# Patient Record
Sex: FEMALE | URBAN - METROPOLITAN AREA
[De-identification: names, ages, dates, MRNs, and addresses within clinical notes are randomized per-mention and may not be internally consistent; named-entity substitution may affect disease eponyms.]

---

## 2022-12-13 ENCOUNTER — APPOINTMENT (OUTPATIENT)
Dept: URBAN - METROPOLITAN AREA SURGERY 8 | Age: 37
Setting detail: DERMATOLOGY
End: 2022-12-13

## 2022-12-13 DIAGNOSIS — D36.1 BENIGN NEOPLASM OF PERIPHERAL NERVES AND AUTONOMIC NERVOUS SYSTEM: ICD-10-CM

## 2022-12-13 DIAGNOSIS — D485 NEOPLASM OF UNCERTAIN BEHAVIOR OF SKIN: ICD-10-CM

## 2022-12-13 DIAGNOSIS — L663 OTHER SPECIFIED DISEASES OF HAIR AND HAIR FOLLICLES: ICD-10-CM

## 2022-12-13 DIAGNOSIS — L738 OTHER SPECIFIED DISEASES OF HAIR AND HAIR FOLLICLES: ICD-10-CM

## 2022-12-13 PROBLEM — D36.16 BENIGN NEOPLASM OF PERIPHERAL NERVES AND AUTONOMIC NERVOUS SYSTEM OF PELVIS: Status: ACTIVE | Noted: 2022-12-13

## 2022-12-13 PROBLEM — L02.421 FURUNCLE OF RIGHT AXILLA: Status: ACTIVE | Noted: 2022-12-13

## 2022-12-13 PROBLEM — D48.5 NEOPLASM OF UNCERTAIN BEHAVIOR OF SKIN: Status: ACTIVE | Noted: 2022-12-13

## 2022-12-13 PROBLEM — L02.422 FURUNCLE OF LEFT AXILLA: Status: ACTIVE | Noted: 2022-12-13

## 2022-12-13 PROCEDURE — OTHER TREATMENT REGIMEN: OTHER

## 2022-12-13 PROCEDURE — OTHER ADDITIONAL NOTES: OTHER

## 2022-12-13 PROCEDURE — OTHER PRESCRIPTION: OTHER

## 2022-12-13 PROCEDURE — OTHER DEFER: OTHER

## 2022-12-13 PROCEDURE — 99203 OFFICE O/P NEW LOW 30 MIN: CPT

## 2022-12-13 PROCEDURE — OTHER MIPS QUALITY: OTHER

## 2022-12-13 PROCEDURE — OTHER COUNSELING: OTHER

## 2022-12-13 PROCEDURE — OTHER REASSURANCE: OTHER

## 2022-12-13 RX ORDER — CLINDAMYCIN PHOSPHATE 10 MG/ML
LOTION TOPICAL
Qty: 60 | Refills: 5 | Status: ERX | COMMUNITY
Start: 2022-12-13

## 2022-12-13 RX ORDER — BENZOYL PEROXIDE 10 %
FOAM (GRAM) TOPICAL
Qty: 156 | Refills: 5 | Status: ERX | COMMUNITY
Start: 2022-12-13

## 2022-12-13 ASSESSMENT — LOCATION SIMPLE DESCRIPTION DERM
LOCATION SIMPLE: LEFT BUTTOCK
LOCATION SIMPLE: LEFT AXILLARY VAULT
LOCATION SIMPLE: LABIA MAJORA
LOCATION SIMPLE: RIGHT AXILLARY VAULT

## 2022-12-13 ASSESSMENT — SEVERITY ASSESSMENT: SEVERITY: CLEAR

## 2022-12-13 ASSESSMENT — LOCATION DETAILED DESCRIPTION DERM
LOCATION DETAILED: LEFT BUTTOCK
LOCATION DETAILED: LEFT AXILLARY VAULT
LOCATION DETAILED: RIGHT AXILLARY VAULT
LOCATION DETAILED: RIGHT LABIUM MAJUS

## 2022-12-13 ASSESSMENT — LOCATION ZONE DERM
LOCATION ZONE: VULVA
LOCATION ZONE: TRUNK
LOCATION ZONE: AXILLAE

## 2022-12-13 NOTE — PROCEDURE: REASSURANCE
Additional Notes (Optional): discussed benign etiology but that could be treated via shave removal or excision as symptomatic
Hide Additional Notes?: No
Detail Level: Simple

## 2022-12-13 NOTE — PROCEDURE: DEFER
Other Procedure: shave removal vs excision
X Size Of Lesion In Cm (Optional): 0
Detail Level: Simple
Size Of Lesion In Cm (Optional): 0.8
Introduction Text (Please End With A Colon): The following procedure was deferred:
Reason To Defer Override: declines exam today. referred to [facility]

## 2022-12-13 NOTE — PROCEDURE: TREATMENT REGIMEN
Initiate Treatment: BPO was 2-3x/week in shower or daily if not too irritating\\nClindmycin lotion daily to BID prn active lesions or as aftershave (though denies she waxes or shaves area)
Plan: Consider addition of doxycycline if not controlled with topicals
Detail Level: Simple

## 2022-12-13 NOTE — HPI: SKIN LESION
How Severe Is Your Skin Lesion?: moderate
Has Your Skin Lesion Been Treated?: been treated
Is This A New Presentation, Or A Follow-Up?: Skin Lesion
What Type Of Note Output Would You Prefer (Optional)?: Standard Output
Has Your Skin Lesion Been Treated?: not been treated

## 2022-12-13 NOTE — PROCEDURE: ADDITIONAL NOTES
Detail Level: Detailed
Additional Notes: Treatment options discussed. Shave biopsy removal vs excision.  Patient would like excision and ok with scar instead of lesion.  Will refer to [facility] due to insurance for treatment and follow up
Render Risk Assessment In Note?: no
Additional Notes: currently clear with no active lesions or scarring. discussed folliculitis vs early HS could appear similar (especially with complains of lesions in groin/vulva but she declined exam at this time)
Additional Notes: no longer accepting [facility] starting January 1st and as she declines treatment at this time due to [place] holiday, will deferral treatment for now and can discuss options when follows up with [facility]
Additional Notes: previously treated by Ob/Gyn with LN2 with minimal improvement. when discussed that no longer accepting her insurance starting January 1st and would have to refer to [facility], she declined exam today and prefers to follow up with female dermatologist\\n\\nwill refer to Dr. Nassar at [facility] given her interest in vulvar dermatology but could see another female physician if needed

## 2022-12-13 NOTE — HPI: RASH
What Type Of Note Output Would You Prefer (Optional)?: Standard Output
How Severe Is Your Rash?: moderate
Is This A New Presentation, Or A Follow-Up?: Rash
Additional History: Come and go. Have been treated with antibiotics in past. Reports she doesn't shave or wax hair in area